# Patient Record
Sex: FEMALE | Race: WHITE | Employment: FULL TIME
[De-identification: names, ages, dates, MRNs, and addresses within clinical notes are randomized per-mention and may not be internally consistent; named-entity substitution may affect disease eponyms.]

---

## 2017-07-15 ENCOUNTER — HEALTH MAINTENANCE LETTER (OUTPATIENT)
Age: 32
End: 2017-07-15

## 2018-10-17 LAB
HBV SURFACE AG SERPL QL IA: NEGATIVE
HIV 1+2 AB+HIV1 P24 AG SERPL QL IA: NON REACTIVE
RUBELLA ABY IGG: NORMAL

## 2019-04-12 LAB — GROUP B STREP PCR: NEGATIVE

## 2019-04-22 ENCOUNTER — HOSPITAL ENCOUNTER (OUTPATIENT)
Facility: CLINIC | Age: 34
Discharge: HOME | End: 2019-04-22
Attending: OBSTETRICS & GYNECOLOGY | Admitting: OBSTETRICS & GYNECOLOGY
Payer: COMMERCIAL

## 2019-04-22 VITALS
SYSTOLIC BLOOD PRESSURE: 117 MMHG | RESPIRATION RATE: 17 BRPM | BODY MASS INDEX: 26.46 KG/M2 | DIASTOLIC BLOOD PRESSURE: 62 MMHG | HEIGHT: 71 IN | TEMPERATURE: 99 F | HEART RATE: 75 BPM | WEIGHT: 189 LBS

## 2019-04-22 DIAGNOSIS — N39.0 URINARY TRACT INFECTION WITHOUT HEMATURIA, SITE UNSPECIFIED: Primary | ICD-10-CM

## 2019-04-22 DIAGNOSIS — Z36.89 ENCOUNTER FOR TRIAGE IN PREGNANT PATIENT: ICD-10-CM

## 2019-04-22 LAB
ALBUMIN UR-MCNC: NEGATIVE MG/DL
APPEARANCE UR: ABNORMAL
BACTERIA #/AREA URNS HPF: ABNORMAL /HPF
BILIRUB UR QL STRIP: NEGATIVE
COLOR UR AUTO: ABNORMAL
GLUCOSE UR STRIP-MCNC: NEGATIVE MG/DL
HGB UR QL STRIP: NEGATIVE
KETONES UR STRIP-MCNC: NEGATIVE MG/DL
LEUKOCYTE ESTERASE UR QL STRIP: ABNORMAL
MUCOUS THREADS #/AREA URNS LPF: PRESENT /LPF
NITRATE UR QL: NEGATIVE
PH UR STRIP: 6 PH (ref 5–7)
RBC #/AREA URNS AUTO: 3 /HPF (ref 0–2)
SOURCE: ABNORMAL
SP GR UR STRIP: 1.01 (ref 1–1.03)
SQUAMOUS #/AREA URNS AUTO: 27 /HPF (ref 0–1)
UROBILINOGEN UR STRIP-MCNC: NORMAL MG/DL (ref 0–2)
WBC #/AREA URNS AUTO: 13 /HPF (ref 0–5)

## 2019-04-22 PROCEDURE — 87086 URINE CULTURE/COLONY COUNT: CPT | Performed by: OBSTETRICS & GYNECOLOGY

## 2019-04-22 PROCEDURE — 25800030 ZZH RX IP 258 OP 636: Performed by: OBSTETRICS & GYNECOLOGY

## 2019-04-22 PROCEDURE — G0463 HOSPITAL OUTPT CLINIC VISIT: HCPCS | Mod: 25

## 2019-04-22 PROCEDURE — 59025 FETAL NON-STRESS TEST: CPT

## 2019-04-22 PROCEDURE — 81001 URINALYSIS AUTO W/SCOPE: CPT | Performed by: OBSTETRICS & GYNECOLOGY

## 2019-04-22 RX ORDER — SODIUM CHLORIDE, SODIUM LACTATE, POTASSIUM CHLORIDE, CALCIUM CHLORIDE 600; 310; 30; 20 MG/100ML; MG/100ML; MG/100ML; MG/100ML
INJECTION, SOLUTION INTRAVENOUS CONTINUOUS
Status: DISCONTINUED | OUTPATIENT
Start: 2019-04-22 | End: 2019-04-22 | Stop reason: HOSPADM

## 2019-04-22 RX ORDER — NITROFURANTOIN 25; 75 MG/1; MG/1
100 CAPSULE ORAL 2 TIMES DAILY
Qty: 10 CAPSULE | Refills: 0 | Status: SHIPPED | OUTPATIENT
Start: 2019-04-22 | End: 2019-04-27

## 2019-04-22 RX ORDER — ONDANSETRON 2 MG/ML
4 INJECTION INTRAMUSCULAR; INTRAVENOUS EVERY 6 HOURS PRN
Status: DISCONTINUED | OUTPATIENT
Start: 2019-04-22 | End: 2019-04-22 | Stop reason: HOSPADM

## 2019-04-22 RX ADMIN — SODIUM CHLORIDE, POTASSIUM CHLORIDE, SODIUM LACTATE AND CALCIUM CHLORIDE: 600; 310; 30; 20 INJECTION, SOLUTION INTRAVENOUS at 17:48

## 2019-04-22 ASSESSMENT — MIFFLIN-ST. JEOR: SCORE: 1668.43

## 2019-04-22 NOTE — PLAN OF CARE
Dr Harden updated of patient arrival and hx--see previous note. Orders to start IV and give 1L LR to try and slow contractions. Recheck SVE at 1900. Will update sooner with any increase in contraction intensity and/or decels. Will send UA.

## 2019-04-22 NOTE — PLAN OF CARE
Data: Patient presented to [facility]: 2019  4:32 PM.  Reason for maternal/fetal assessment is uterine contractions. Patient reports contractions started at 0900 this AM. Reports cramping like sensations, rates pain 2/10. Nervous with abnormal cord insertion and hx or faster delivery once she really kicked into labor.  Patient is a .  Prenatal record reviewed. Pregnancy  has been complicated by velamentous cord insertion. +2 dependent pitting edema noted. BP WNL.  Gestational Age 37w2d. VSS. Fetal movement present. Patient denies leaking of vaginal fluid/rupture of membranes, vaginal bleeding, pelvic pressure, nausea, vomiting, headache, visual disturbances, epigastric or URQ pain. Support person is present.   Action: Verbal consent for EFM. Triage assessment completed. Bill of rights reviewed. Oriented to room and call light, patient verbalized understanding.  Response: Patient verbalized agreement with plan. Will contact Dr Kortney Aponte with update and further orders.

## 2019-04-23 LAB
BACTERIA SPEC CULT: NORMAL
Lab: NORMAL
SPECIMEN SOURCE: NORMAL

## 2019-04-23 NOTE — PROGRESS NOTES
MD updated of SVE no change. UCsq2-5 after IV flush. FHTs WNL. Will send UA for culture. Will send macrobid 100mg BID. Patient OK to discharge to home now, or can stay and be rechecked in 1 hour.

## 2019-04-23 NOTE — PROGRESS NOTES
Data: Patient assessed in the [facility] for uterine contractions.  Cervical exam 3/70/-2.  Membranes intact.  Contractions not indicative of labor.  Action:  Presumed adequate fetal oxygenation documented (see flow record). Discharge instructions reviewed.  Patient instructed to report change in fetal movement, vaginal leaking of fluid or bleeding, abdominal pain, or any concerns related to the pregnancy to her nurse/physician.    Response: Orders to discharge home per Kortney Aponte.  Patient verbalized understanding of education and verbalized agreement with plan. Discharged to home at 1930.

## 2019-04-23 NOTE — DISCHARGE INSTRUCTIONS
Discharge Instruction for Undelivered Patients      You were seen for: Labor Assessment  We Consulted: Dr Harden  You had (Test or Medicine):Non-stress test, UA     Diet:   Drink 8 to 12 glasses of liquids (milk, juice, water) every day.  You may eat meals and snacks.     Activity:  Call your doctor or nurse midwife if your baby is moving less than usual.     Call your provider if you notice:  Swelling in your face or increased swelling in your hands or legs.  Headaches that are not relieved by Tylenol (acetaminophen).  Changes in your vision (blurring: seeing spots or stars.)  Nausea (sick to your stomach) and vomiting (throwing up).   Weight gain of 5 pounds or more per week.  Heartburn that doesn't go away.  Signs of bladder infection: pain when you urinate (use the toilet), need to go more often and more urgently.  The bag of davis (rupture of membranes) breaks, or you notice leaking in your underwear.  Bright red blood in your underwear.  Abdominal (lower belly) or stomach pain.  Second (plus) baby: Contractions (tightening) less than 10 minutes apart and getting stronger.  Increase or change in vaginal discharge (note the color and amount)  Other: start antibiotics, drink fluids.    Follow-up:  As scheduled in the clinic

## 2019-04-24 ENCOUNTER — ANESTHESIA EVENT (OUTPATIENT)
Dept: OBGYN | Facility: CLINIC | Age: 34
End: 2019-04-24
Payer: COMMERCIAL

## 2019-04-24 ENCOUNTER — HOSPITAL ENCOUNTER (INPATIENT)
Facility: CLINIC | Age: 34
LOS: 1 days | Discharge: HOME HEALTH | End: 2019-04-25
Attending: OBSTETRICS & GYNECOLOGY | Admitting: OBSTETRICS & GYNECOLOGY
Payer: COMMERCIAL

## 2019-04-24 ENCOUNTER — ANESTHESIA (OUTPATIENT)
Dept: OBGYN | Facility: CLINIC | Age: 34
End: 2019-04-24
Payer: COMMERCIAL

## 2019-04-24 LAB
ABO + RH BLD: NORMAL
ABO + RH BLD: NORMAL
HGB BLD-MCNC: 11.5 G/DL (ref 11.7–15.7)
SPECIMEN EXP DATE BLD: NORMAL

## 2019-04-24 PROCEDURE — 27110038 ZZH RX 271: Performed by: ANESTHESIOLOGY

## 2019-04-24 PROCEDURE — 00HU33Z INSERTION OF INFUSION DEVICE INTO SPINAL CANAL, PERCUTANEOUS APPROACH: ICD-10-PCS | Performed by: ANESTHESIOLOGY

## 2019-04-24 PROCEDURE — 37000011 ZZH ANESTHESIA WARD SERVICE

## 2019-04-24 PROCEDURE — 25000132 ZZH RX MED GY IP 250 OP 250 PS 637: Performed by: OBSTETRICS & GYNECOLOGY

## 2019-04-24 PROCEDURE — 72200001 ZZH LABOR CARE VAGINAL DELIVERY SINGLE

## 2019-04-24 PROCEDURE — 3E0R3BZ INTRODUCTION OF ANESTHETIC AGENT INTO SPINAL CANAL, PERCUTANEOUS APPROACH: ICD-10-PCS | Performed by: ANESTHESIOLOGY

## 2019-04-24 PROCEDURE — 86900 BLOOD TYPING SEROLOGIC ABO: CPT | Performed by: OBSTETRICS & GYNECOLOGY

## 2019-04-24 PROCEDURE — 25000128 H RX IP 250 OP 636: Performed by: ANESTHESIOLOGY

## 2019-04-24 PROCEDURE — 0064U ANTB TP TOTAL&RPR IA QUAL: CPT | Performed by: OBSTETRICS & GYNECOLOGY

## 2019-04-24 PROCEDURE — G0463 HOSPITAL OUTPT CLINIC VISIT: HCPCS

## 2019-04-24 PROCEDURE — 25000128 H RX IP 250 OP 636

## 2019-04-24 PROCEDURE — 40000671 ZZH STATISTIC ANESTHESIA CASE

## 2019-04-24 PROCEDURE — 85018 HEMOGLOBIN: CPT | Performed by: OBSTETRICS & GYNECOLOGY

## 2019-04-24 PROCEDURE — 86901 BLOOD TYPING SEROLOGIC RH(D): CPT | Performed by: OBSTETRICS & GYNECOLOGY

## 2019-04-24 PROCEDURE — 25800030 ZZH RX IP 258 OP 636: Performed by: ANESTHESIOLOGY

## 2019-04-24 PROCEDURE — 25800030 ZZH RX IP 258 OP 636: Performed by: OBSTETRICS & GYNECOLOGY

## 2019-04-24 PROCEDURE — 12000000 ZZH R&B MED SURG/OB

## 2019-04-24 PROCEDURE — 0KQM0ZZ REPAIR PERINEUM MUSCLE, OPEN APPROACH: ICD-10-PCS | Performed by: OBSTETRICS & GYNECOLOGY

## 2019-04-24 PROCEDURE — 25000125 ZZHC RX 250: Performed by: OBSTETRICS & GYNECOLOGY

## 2019-04-24 RX ORDER — OXYCODONE AND ACETAMINOPHEN 5; 325 MG/1; MG/1
1 TABLET ORAL
Status: DISCONTINUED | OUTPATIENT
Start: 2019-04-24 | End: 2019-04-24

## 2019-04-24 RX ORDER — ACETAMINOPHEN 325 MG/1
650 TABLET ORAL EVERY 4 HOURS PRN
Status: DISCONTINUED | OUTPATIENT
Start: 2019-04-24 | End: 2019-04-25 | Stop reason: HOSPADM

## 2019-04-24 RX ORDER — NALOXONE HYDROCHLORIDE 0.4 MG/ML
.1-.4 INJECTION, SOLUTION INTRAMUSCULAR; INTRAVENOUS; SUBCUTANEOUS
Status: DISCONTINUED | OUTPATIENT
Start: 2019-04-24 | End: 2019-04-24

## 2019-04-24 RX ORDER — ONDANSETRON 2 MG/ML
INJECTION INTRAMUSCULAR; INTRAVENOUS
Status: COMPLETED
Start: 2019-04-24 | End: 2019-04-24

## 2019-04-24 RX ORDER — CARBOPROST TROMETHAMINE 250 UG/ML
250 INJECTION, SOLUTION INTRAMUSCULAR
Status: DISCONTINUED | OUTPATIENT
Start: 2019-04-24 | End: 2019-04-24

## 2019-04-24 RX ORDER — AMOXICILLIN 250 MG
2 CAPSULE ORAL 2 TIMES DAILY
Status: DISCONTINUED | OUTPATIENT
Start: 2019-04-24 | End: 2019-04-25 | Stop reason: HOSPADM

## 2019-04-24 RX ORDER — IBUPROFEN 800 MG/1
800 TABLET, FILM COATED ORAL
Status: DISCONTINUED | OUTPATIENT
Start: 2019-04-24 | End: 2019-04-24

## 2019-04-24 RX ORDER — OXYTOCIN 10 [USP'U]/ML
10 INJECTION, SOLUTION INTRAMUSCULAR; INTRAVENOUS
Status: DISCONTINUED | OUTPATIENT
Start: 2019-04-24 | End: 2019-04-25 | Stop reason: HOSPADM

## 2019-04-24 RX ORDER — HYDROMORPHONE HYDROCHLORIDE 1 MG/ML
INJECTION, SOLUTION INTRAMUSCULAR; INTRAVENOUS; SUBCUTANEOUS
Status: COMPLETED
Start: 2019-04-24 | End: 2019-04-24

## 2019-04-24 RX ORDER — OXYTOCIN/0.9 % SODIUM CHLORIDE 30/500 ML
1-24 PLASTIC BAG, INJECTION (ML) INTRAVENOUS CONTINUOUS
Status: DISCONTINUED | OUTPATIENT
Start: 2019-04-24 | End: 2019-04-24

## 2019-04-24 RX ORDER — OXYTOCIN 10 [USP'U]/ML
10 INJECTION, SOLUTION INTRAMUSCULAR; INTRAVENOUS
Status: DISCONTINUED | OUTPATIENT
Start: 2019-04-24 | End: 2019-04-24

## 2019-04-24 RX ORDER — AMOXICILLIN 250 MG
1 CAPSULE ORAL 2 TIMES DAILY
Status: DISCONTINUED | OUTPATIENT
Start: 2019-04-24 | End: 2019-04-25 | Stop reason: HOSPADM

## 2019-04-24 RX ORDER — ONDANSETRON 2 MG/ML
4 INJECTION INTRAMUSCULAR; INTRAVENOUS EVERY 6 HOURS PRN
Status: DISCONTINUED | OUTPATIENT
Start: 2019-04-24 | End: 2019-04-24

## 2019-04-24 RX ORDER — OXYTOCIN/0.9 % SODIUM CHLORIDE 30/500 ML
100 PLASTIC BAG, INJECTION (ML) INTRAVENOUS CONTINUOUS
Status: DISCONTINUED | OUTPATIENT
Start: 2019-04-24 | End: 2019-04-25 | Stop reason: HOSPADM

## 2019-04-24 RX ORDER — OXYTOCIN/0.9 % SODIUM CHLORIDE 30/500 ML
340 PLASTIC BAG, INJECTION (ML) INTRAVENOUS CONTINUOUS PRN
Status: DISCONTINUED | OUTPATIENT
Start: 2019-04-24 | End: 2019-04-25 | Stop reason: HOSPADM

## 2019-04-24 RX ORDER — FENTANYL CITRATE 50 UG/ML
50-100 INJECTION, SOLUTION INTRAMUSCULAR; INTRAVENOUS
Status: DISCONTINUED | OUTPATIENT
Start: 2019-04-24 | End: 2019-04-24

## 2019-04-24 RX ORDER — ONDANSETRON 4 MG/1
4 TABLET, ORALLY DISINTEGRATING ORAL EVERY 6 HOURS PRN
Status: DISCONTINUED | OUTPATIENT
Start: 2019-04-24 | End: 2019-04-24

## 2019-04-24 RX ORDER — HYDROCORTISONE 2.5 %
CREAM (GRAM) TOPICAL 3 TIMES DAILY PRN
Status: DISCONTINUED | OUTPATIENT
Start: 2019-04-24 | End: 2019-04-25 | Stop reason: HOSPADM

## 2019-04-24 RX ORDER — ONDANSETRON 2 MG/ML
4 INJECTION INTRAMUSCULAR; INTRAVENOUS EVERY 6 HOURS PRN
Status: DISCONTINUED | OUTPATIENT
Start: 2019-04-24 | End: 2019-04-25 | Stop reason: HOSPADM

## 2019-04-24 RX ORDER — BUPIVACAINE HCL/0.9 % NACL/PF 0.125 %
PLASTIC BAG, INJECTION (ML) EPIDURAL CONTINUOUS
Status: DISCONTINUED | OUTPATIENT
Start: 2019-04-24 | End: 2019-04-24

## 2019-04-24 RX ORDER — EPHEDRINE SULFATE 50 MG/ML
5 INJECTION, SOLUTION INTRAMUSCULAR; INTRAVENOUS; SUBCUTANEOUS
Status: DISCONTINUED | OUTPATIENT
Start: 2019-04-24 | End: 2019-04-24

## 2019-04-24 RX ORDER — HALOPERIDOL 5 MG/ML
1 INJECTION INTRAMUSCULAR EVERY 6 HOURS PRN
Status: DISCONTINUED | OUTPATIENT
Start: 2019-04-24 | End: 2019-04-25 | Stop reason: HOSPADM

## 2019-04-24 RX ORDER — LANOLIN 100 %
OINTMENT (GRAM) TOPICAL
Status: DISCONTINUED | OUTPATIENT
Start: 2019-04-24 | End: 2019-04-25 | Stop reason: HOSPADM

## 2019-04-24 RX ORDER — HALOPERIDOL 5 MG/ML
1 INJECTION INTRAMUSCULAR EVERY 6 HOURS PRN
Status: DISCONTINUED | OUTPATIENT
Start: 2019-04-24 | End: 2019-04-24

## 2019-04-24 RX ORDER — NALOXONE HYDROCHLORIDE 0.4 MG/ML
.1-.4 INJECTION, SOLUTION INTRAMUSCULAR; INTRAVENOUS; SUBCUTANEOUS
Status: DISCONTINUED | OUTPATIENT
Start: 2019-04-24 | End: 2019-04-25 | Stop reason: HOSPADM

## 2019-04-24 RX ORDER — ACETAMINOPHEN 325 MG/1
650 TABLET ORAL EVERY 4 HOURS PRN
Status: DISCONTINUED | OUTPATIENT
Start: 2019-04-24 | End: 2019-04-24

## 2019-04-24 RX ORDER — BISACODYL 10 MG
10 SUPPOSITORY, RECTAL RECTAL DAILY PRN
Status: DISCONTINUED | OUTPATIENT
Start: 2019-04-26 | End: 2019-04-25 | Stop reason: HOSPADM

## 2019-04-24 RX ORDER — OXYTOCIN/0.9 % SODIUM CHLORIDE 30/500 ML
100-340 PLASTIC BAG, INJECTION (ML) INTRAVENOUS CONTINUOUS PRN
Status: COMPLETED | OUTPATIENT
Start: 2019-04-24 | End: 2019-04-24

## 2019-04-24 RX ORDER — LIDOCAINE 40 MG/G
CREAM TOPICAL
Status: DISCONTINUED | OUTPATIENT
Start: 2019-04-24 | End: 2019-04-24

## 2019-04-24 RX ORDER — METHYLERGONOVINE MALEATE 0.2 MG/ML
200 INJECTION INTRAVENOUS
Status: DISCONTINUED | OUTPATIENT
Start: 2019-04-24 | End: 2019-04-24

## 2019-04-24 RX ORDER — IBUPROFEN 800 MG/1
800 TABLET, FILM COATED ORAL EVERY 6 HOURS PRN
Status: DISCONTINUED | OUTPATIENT
Start: 2019-04-24 | End: 2019-04-25 | Stop reason: HOSPADM

## 2019-04-24 RX ORDER — SODIUM CHLORIDE, SODIUM LACTATE, POTASSIUM CHLORIDE, CALCIUM CHLORIDE 600; 310; 30; 20 MG/100ML; MG/100ML; MG/100ML; MG/100ML
INJECTION, SOLUTION INTRAVENOUS CONTINUOUS
Status: DISCONTINUED | OUTPATIENT
Start: 2019-04-24 | End: 2019-04-24

## 2019-04-24 RX ORDER — NALBUPHINE HYDROCHLORIDE 10 MG/ML
2.5-5 INJECTION, SOLUTION INTRAMUSCULAR; INTRAVENOUS; SUBCUTANEOUS EVERY 6 HOURS PRN
Status: DISCONTINUED | OUTPATIENT
Start: 2019-04-24 | End: 2019-04-24

## 2019-04-24 RX ADMIN — ONDANSETRON 4 MG: 2 INJECTION INTRAMUSCULAR; INTRAVENOUS at 17:56

## 2019-04-24 RX ADMIN — LIDOCAINE HYDROCHLORIDE 20 ML: 10 INJECTION, SOLUTION EPIDURAL; INFILTRATION; INTRACAUDAL; PERINEURAL at 11:14

## 2019-04-24 RX ADMIN — IBUPROFEN 800 MG: 800 TABLET ORAL at 13:31

## 2019-04-24 RX ADMIN — SODIUM CHLORIDE, POTASSIUM CHLORIDE, SODIUM LACTATE AND CALCIUM CHLORIDE: 600; 310; 30; 20 INJECTION, SOLUTION INTRAVENOUS at 09:27

## 2019-04-24 RX ADMIN — SODIUM CHLORIDE, POTASSIUM CHLORIDE, SODIUM LACTATE AND CALCIUM CHLORIDE 1000 ML: 600; 310; 30; 20 INJECTION, SOLUTION INTRAVENOUS at 09:15

## 2019-04-24 RX ADMIN — SENNOSIDES AND DOCUSATE SODIUM 1 TABLET: 8.6; 5 TABLET ORAL at 20:19

## 2019-04-24 RX ADMIN — IBUPROFEN 800 MG: 800 TABLET ORAL at 20:18

## 2019-04-24 RX ADMIN — Medication: at 09:20

## 2019-04-24 RX ADMIN — OXYTOCIN-SODIUM CHLORIDE 0.9% IV SOLN 30 UNIT/500ML 340 ML/HR: 30-0.9/5 SOLUTION at 11:21

## 2019-04-24 RX ADMIN — Medication 0.5 MG: at 09:20

## 2019-04-24 RX ADMIN — ACETAMINOPHEN 650 MG: 325 TABLET, FILM COATED ORAL at 20:18

## 2019-04-24 RX ADMIN — ONDANSETRON 4 MG: 2 INJECTION INTRAMUSCULAR; INTRAVENOUS at 10:07

## 2019-04-24 ASSESSMENT — MIFFLIN-ST. JEOR: SCORE: 1682.04

## 2019-04-24 NOTE — PLAN OF CARE
Data: Marla Pavon transferred to 426 via cart at 1530. Baby transferred via parent's arms.  Action: Receiving unit notified of transfer: Yes. Patient and family notified of room change. Report given to Laurie at 1530. Belongings sent to receiving unit. Accompanied by Registered Nurse. Oriented patient to surroundings. Call light within reach. ID bands double-checked with receiving RN.  Response: Patient tolerated transfer and is stable.

## 2019-04-24 NOTE — PROVIDER NOTIFICATION
MD text paged cervical update and pt status. Will hold off on pitocin due to good cervical change and adequate contractions.

## 2019-04-24 NOTE — PROVIDER NOTIFICATION
19 0651   Provider Notification   Provider Name/Title    Method of Notification Phone   Request Evaluate - Remote   Notification Reason Patient Arrived;Labor Status;Uterine Activity;Pain;SVE    in the department and updated with patient arrival.  at 37.4 wks here in labor. FHT category 1 tracing. Contractions every 4-5 min apart, patient needing to breath with contractions however coping. MD aware of patient's history. SVE 4.5/70-80/-2. Intrapartum orders received, epidural ok. Plan to move patient into labor room. POC discussed with patient and FOB.

## 2019-04-24 NOTE — BRIEF OP NOTE
Delivery Summary:    1. IUP at 37+4/7 wks. Spontaneous labor.   2. GBS negative, RH positive. AROM with clear fluid.  3. Epidural for pain management. Cat 2 tracing prior to delivery.   4.  of male in SYLVIA presentation without nuchal cord. Delayed clamp done.   5. Placenta intact, marginal cord. Disposed of.   6. Perineum with 2nd degree lac with 3-0 vicryl for repair.   7. See chart for QBL.   8. Dr. Burr for delivery.     Margaret Burr

## 2019-04-24 NOTE — ANESTHESIA PREPROCEDURE EVALUATION
"Anesthesia Pre-Procedure Evaluation    Patient: Marla Pavon   MRN: [id number] : 1985          Preoperative Diagnosis: * No surgery found *        Past Medical History:   Diagnosis Date     NO ACTIVE PROBLEMS      Past Surgical History:   Procedure Laterality Date     ARTHROSCOPIC RECONSTRUCTION ANTERIOR CRUCIATE LIGAMENT  2000    Left     DENTAL SURGERY       ENT SURGERY       MOUTH SURGERY  2008    4 wisdom teeth 2 different surg     MOUTH SURGERY  1999    Molars ucovered     SINUS SURGERY  2009     Anesthesia Evaluation       history and physical reviewed .             ROS/MED HX    ENT/Pulmonary:  - neg pulmonary ROS     Neurologic:  - neg neurologic ROS     Cardiovascular:  - neg cardiovascular ROS       METS/Exercise Tolerance:     Hematologic:         Musculoskeletal:         GI/Hepatic:  - neg GI/hepatic ROS       Renal/Genitourinary:         Endo:         Psychiatric:         Infectious Disease:         Malignancy:         Other:                     neg OB ROS            Physical Exam      Airway     Dental     Cardiovascular       Pulmonary     Other findings:  at term, in labor, requesting pain  management        Lab Results   Component Value Date    WBC 8.2 2011    HGB 11.5 (L) 2019    HCT 40.3 2011     2011    SED 4 2011    HCG Negative 2011       Preop Vitals  BP Readings from Last 3 Encounters:   19 122/74   19 117/62   16 105/64    Pulse Readings from Last 3 Encounters:   19 75   11 58      Resp Readings from Last 3 Encounters:   19 18   19 17   16 16    SpO2 Readings from Last 3 Encounters:   11 98%      Temp Readings from Last 1 Encounters:   19 98.8  F (37.1  C) (Oral)    Ht Readings from Last 1 Encounters:   19 1.803 m (5' 11\")      Wt Readings from Last 1 Encounters:   19 87.1 kg (192 lb)    Estimated body mass index is 26.78 kg/m  as calculated from the following:    " "Height as of this encounter: 1.803 m (5' 11\").    Weight as of this encounter: 87.1 kg (192 lb).       Anesthesia Plan      History & Physical Review      ASA Status:  2 .  OB Epidural Asa: 2       Plan for     Discussed risks of epidural catheter placement, including, but not limited to, bruising/bleeding, infection, pain, failure of epidural medications to relieve pain, dural puncture with subsequent headache/ need for epidural blood patch and nerve damage.  All questions answered, understanding voiced and she wishes to proceed.      Postoperative Care      Consents  Anesthetic plan, risks, benefits and alternatives discussed with:  Patient..                 Gumaro Gomez MD                    .  "

## 2019-04-24 NOTE — PROVIDER NOTIFICATION
Dr Burr at bedside for AROM. Clear fluid. Ok with epidural anytime. Orders to start pitocin after epidural if needed. Cat 1 tracing.

## 2019-04-24 NOTE — L&D DELIVERY NOTE
Delivery Date:  2019      INDICATIONS:  The patient is a 33-year-old G2, P1-0-0-1 at 37 and 4/7 weeks whose pregnancy was complicated by a marginal cord insertion followed with growth ultrasounds where growth was greater than the 90th percentile.  She was seen in the office yesterday and had stripping of membranes and had occasional contractions afterwards.  She began having regular painful contractions this a.m. and was seen in Labor and Delivery, at which time she was found to have regular cervical change with contractions and declared to be in labor.  She was admitted and at approximately 4 cm and underwent an assisted rupture of membranes with clear fluid and received an epidural shortly thereafter.  She made it uncomplicated to complete cervical dilation and began actively pushing.  She was known to be GBS negative, Rh positive and had primarily a category 1 tracing.  However, during pushing, a category 2, she delivered a male infant in SYLVIA presentation with no evidence of nuchal cord.  A delayed cord clamp was performed.  The cord was then clamped by myself and cut by the father.  The placenta was found to be intact.  Marginal cord insertion was disposed, and the perineum was found to have a second-degree perineal laceration that was repaired with 3-0 Vicryl in the normal fashion.  Please see her chart for QBL.  Dr. Alanis was present for delivery.  She will be transferred to postpartum in stable condition.         KADE ALANIS MD             D: 2019   T: 2019   MT:       Name:     MAGALY GORDON   MRN:      [id number]        Account:        [id number]   :      1985        Delivery Date:  2019               Document: [id number]

## 2019-04-24 NOTE — PLAN OF CARE
Data: Patient presented to [facility]: 2019  6:09 AM.  Reason for maternal/fetal assessment is uterine contractions. Patient reports having contractions all day yesterday and last-night, noticing increasing vaginal discharge.  Patient is a .  Prenatal record reviewed. Pregnancy  has been complicated by villamentous cord insertion.  Gestational Age 37w4d. VSS. Fetal movement present. Patient denies leaking of vaginal fluid/rupture of membranes, vaginal bleeding, abdominal pain, pelvic pressure, nausea, vomiting, headache, visual disturbances, epigastric or URQ pain, significant edema. Support person is present.   Action: Verbal consent for EFM. Triage assessment completed. Bill of rights reviewed.  Response: Patient verbalized agreement with plan. Will contact Dr Margaret Burr with update and further orders.

## 2019-04-24 NOTE — ANESTHESIA PROCEDURE NOTES
Peripheral nerve/Neuraxial procedure note : epidural catheter  Pre-Procedure  Performed by Gumaro Gomez MD  Location: OB, floor    Procedure Times:4/24/2019 9:11 AM and 4/24/2019 9:29 AM  Pre-Anesthestic Checklist: patient identified, IV checked, risks and benefits discussed, informed consent, monitors and equipment checked, pre-op evaluation and at physician/surgeon's request    Timeout  Correct Patient: Yes   Correct Procedure: Yes   Correct Site: Yes   Correct Laterality: N/A   Correct Position: Yes   Site Marked: No   .   Procedure Documentation    .    Procedure:    Epidural catheter.  Insertion Site:L2-3  (midline approach) Injection technique: LORT saline   Local skin infiltrated with 2.5 mL of 1% lidocaine.  SYLWIA at 5 cm     Patient Prep;mask, sterile gloves, povidone-iodine 7.5% surgical scrub, patient draped.  .  Needle: Touhy needle Needle Gauge: 17.    Needle Length (Inches) 3.5  # of attempts: 1 and # of redirects:  .   . .  Catheter threaded easily  5 cm epidural space.  10 cm at skin.   .    Assessment/Narrative  Paresthesias: No.  .  .  Aspiration negative for heme or CSF  . Test dose of 5 mL lidocaine 1.5% w/ 1:200,000 epinephrine at 09:20.  Test dose negative for signs of intravascular, subdural or intrathecal injection. Comments:  I or my partner am immediately available. I or my partner will monitor the patient and supervise nursing care at necessary intervals.    Given 10 ml 0.125% bupivicaine infusion with 0.5 mg hydromorphone.4/24/2019 9:20 AM

## 2019-04-24 NOTE — H&P
No significant change in general health status based on examination of the patient, review of Nursing Admission Database and prenatal record.    EFW: 8lb 8oz     Margaret Burr

## 2019-04-25 VITALS
SYSTOLIC BLOOD PRESSURE: 100 MMHG | RESPIRATION RATE: 16 BRPM | HEART RATE: 63 BPM | WEIGHT: 192 LBS | HEIGHT: 71 IN | TEMPERATURE: 98.3 F | OXYGEN SATURATION: 100 % | DIASTOLIC BLOOD PRESSURE: 65 MMHG | BODY MASS INDEX: 26.88 KG/M2

## 2019-04-25 LAB
HGB BLD-MCNC: 10.6 G/DL (ref 11.7–15.7)
T PALLIDUM AB SER QL: NONREACTIVE

## 2019-04-25 PROCEDURE — 36415 COLL VENOUS BLD VENIPUNCTURE: CPT | Performed by: OBSTETRICS & GYNECOLOGY

## 2019-04-25 PROCEDURE — 25000132 ZZH RX MED GY IP 250 OP 250 PS 637: Performed by: OBSTETRICS & GYNECOLOGY

## 2019-04-25 PROCEDURE — 85018 HEMOGLOBIN: CPT | Performed by: OBSTETRICS & GYNECOLOGY

## 2019-04-25 RX ORDER — IBUPROFEN 800 MG/1
800 TABLET, FILM COATED ORAL EVERY 6 HOURS PRN
COMMUNITY
Start: 2019-04-25

## 2019-04-25 RX ORDER — ACETAMINOPHEN 325 MG/1
650 TABLET ORAL EVERY 4 HOURS PRN
COMMUNITY
Start: 2019-04-25

## 2019-04-25 RX ADMIN — ACETAMINOPHEN 650 MG: 325 TABLET, FILM COATED ORAL at 08:51

## 2019-04-25 RX ADMIN — ACETAMINOPHEN 650 MG: 325 TABLET, FILM COATED ORAL at 02:43

## 2019-04-25 RX ADMIN — IBUPROFEN 800 MG: 800 TABLET ORAL at 02:43

## 2019-04-25 RX ADMIN — SENNOSIDES AND DOCUSATE SODIUM 1 TABLET: 8.6; 5 TABLET ORAL at 08:52

## 2019-04-25 RX ADMIN — IBUPROFEN 800 MG: 800 TABLET ORAL at 08:52

## 2019-04-25 NOTE — PLAN OF CARE
"Pt able to get some periods of rest between cares w/  rooming-in for night.  Taking ibuprofen and tylenol when she can, to \"stay ahead of the pain\".  Tucks or ice to nguyen lac.  Voiding w/o difficulty.  BPs still slightly low; HR stable and WDL.  Pt states she occasionally feels dizzy.  Encouraged her to take care when ambulating and changing position; pt will call for assist if she feels she can't ambulate d/t dizziness.  Ambulating w/o difficulty in room, however.  Spouse present and supportive.  "

## 2019-04-25 NOTE — PLAN OF CARE
UAL. VSS. States she is voiding without difficulty. Reports minimal pain for which she has taken Ibuprofen and states this has been adequate.Requesting  discharge today. Discharge instructions completed. Patient states she understands all discharge instructions and all her questions have been answered. Verbalizes when she needs to return to the clinic for follow up for herself and baby. She is caring for herself and her baby independently.    Discharged to home with baby at 1430.

## 2019-04-25 NOTE — PLAN OF CARE
Patients mobililty level scored using the bedside mobility assistance tool (BMAT). Patient is at a mobility level test number: 4. Mobility equipment used: none required. Required assist of 0 staff members. Further use of BMAT scoring not required.

## 2019-04-25 NOTE — ANESTHESIA POSTPROCEDURE EVALUATION
Patient: Marla Pavon    * No procedures listed *    Diagnosis:* No pre-op diagnosis entered *  Diagnosis Additional Information: IUP, in labor    Anesthesia Type:  Epidural    Note:  Anesthesia Post Evaluation         Comments:     S/P epidural for labor.   I or my partner was immediately available for management of this patient during epidural analgesia infusion.  VSS.  Doing well. Block resolved.  Neuro at baseline. Denies positional headache. Minimal side effects easily managed w/ PRN meds. No apparent anesthetic complications. No follow-up required.    Aung Montes MD          Last vitals:  Vitals:    04/24/19 2300 04/25/19 0243 04/25/19 0824   BP: 91/52 100/61 100/65   Pulse: 66  63   Resp: 18 18 16   Temp: 98.2  F (36.8  C) 98.2  F (36.8  C) 98.3  F (36.8  C)   SpO2:            Electronically Signed By: Aung Montes MD  April 25, 2019  4:36 PM

## 2019-04-25 NOTE — DISCHARGE INSTRUCTIONS
Make appointment to be seen by your provider in 6 weeks.    Lactation Consultant [phone]    [facility] [phone]    Postpartum Vaginal Delivery Instructions    Activity       Ask family and friends for help when you need it.    Do not place anything in your vagina for 6 weeks.    You are not restricted on other activities, but take it easy for a few weeks to allow your body to recover from delivery.  You are able to do any activities you feel up to that point.    No driving until you have stopped taking your pain medications (usually two weeks after delivery).     Call your health care provider if you have any of these symptoms:       Increased pain, swelling, redness, or fluid around your stiches from an episiotomy or perineal tear.    A fever above 100.4 F (38 C) with or without chills when placing a thermometer under your tongue.    You soak a sanitary pad with blood within 1 hour, or you see blood clots larger than a golf ball.    Bleeding that lasts more than 6 weeks.    Vaginal discharge that smells bad.    Severe pain, cramping or tenderness in your lower belly area.    A need to urinate more frequently (use the toilet more often), more urgently (use the toilet very quickly), or it burns when you urinate.    Nausea and vomiting.    Redness, swelling or pain around a vein in your leg.    Problems breastfeeding or a red or painful area on your breast.    Chest pain and cough or are gasping for air.    Problems coping with sadness, anxiety, or depression.  If you have any concerns about hurting yourself or the baby, call your provider immediately.     You have questions or concerns after you return home.     Keep your hands clean:  Always wash your hands before touching your perineal area and stitches.  This helps reduce your risk of infection.  If your hands aren't dirty, you may use an alcohol hand-rub to clean your hands. Keep your nails clean and short.

## 2019-04-25 NOTE — LACTATION NOTE
This note was copied from a baby's chart.  LC visit.  Her baby has been nursing well per her report.  She has a hx of overreactive letdown and oversupply so went to pumping and bottling for her first baby. LC reviewed ways of relieving some pressure to allow infant to relax during nursing and initial letdown.  LC encouraged her to try a hand pump prior to nursing if this baby also has a problem with letdown.  She is aware she may call prn.  No issues with nursing noted yet.

## 2019-04-25 NOTE — PLAN OF CARE
Resumed care after transfer. Room orientation completed. Call light within reach. Bands verified with ASHER Zepeda. VSS. Tolerating regular diet well. Pt up ad bri in room. Voiding without difficulty. Education and latch assistance provided. Bonding well with .  at bedside and supportive. Continue to monitor.

## 2019-10-08 ENCOUNTER — THERAPY VISIT (OUTPATIENT)
Dept: PHYSICAL THERAPY | Facility: CLINIC | Age: 34
End: 2019-10-08
Payer: COMMERCIAL

## 2019-10-08 DIAGNOSIS — M99.05 PELVIC SOMATIC DYSFUNCTION: ICD-10-CM

## 2019-10-08 DIAGNOSIS — M25.559 PAIN IN JOINT, PELVIC REGION AND THIGH, UNSPECIFIED LATERALITY: ICD-10-CM

## 2019-10-08 PROCEDURE — 97161 PT EVAL LOW COMPLEX 20 MIN: CPT | Mod: GP | Performed by: PHYSICAL THERAPIST

## 2019-10-08 PROCEDURE — 97110 THERAPEUTIC EXERCISES: CPT | Mod: GP | Performed by: PHYSICAL THERAPIST

## 2019-10-08 PROCEDURE — 97140 MANUAL THERAPY 1/> REGIONS: CPT | Mod: GP | Performed by: PHYSICAL THERAPIST

## 2019-10-08 PROCEDURE — 97112 NEUROMUSCULAR REEDUCATION: CPT | Mod: GP | Performed by: PHYSICAL THERAPIST

## 2019-10-08 NOTE — PROGRESS NOTES
[facility] Initial Evaluation  Subjective:  The history is provided by the patient.   Type of problem:  Pelvic dysfunction and other   Condition occurred with:  During pregnancy and after pregnancy. This is a chronic condition   Problem details: Patient's Chief Complaint: pt complains of pelvic pain/adductor and groin pain that has been ongoing since 14 weeks into her 2nd pregnancy and post partum.  She is 5 1/2 months post partum, .  Pt reports that pain is limiting her ability to walk, exercise and run.  Pt points to pain in R adductor region > left.  Does admit to pain with intercourse.  Has mild urge incontinence that is infrequent.  During her pregnancies, she reports babies sitting very low on her pelvis and she did experience pubic symphysis pain with her 2nd pregnancy.  Pt no longer breastfeeding.    Date of Onset: MD order dated 2019    Pain rating is: 4/10    Quality of pain is sharp/dull ache and frequency is constant/intermittent depending on activity    Pain dependence on time of day is: worse in the evening    Progression of symptoms: same    Imaging/Special Tests include: none    Previous treatments include: NA    Patient reports general health is: excellent    Pertinent Medical History includes: NA    Surgical History includes: L ACL surgery    Current Occupation is:     Barriers include: NA    Red flags include: NA    Patient's expectations for therapy include: improve strength to be able to resume active lifestyle  .   Patient reports pain:  SI joint left and groin. Radiates to:  Pubis and groin right. Associated with: weakness. Symptoms are exacerbated by walking, intercourse and running (standing) and relieved by rest.                      Objective:    Gait:  Decreased R hip extension with increased pelvic rotation noted with swing phase          Flexibility/Screens:   Positive screens:  SI Joint                   Lumbar/SI Evaluation  ROM:  AROM  Lumbar: normal      Lumbar Myotomes:  normal                                                    Pelvic Dysfunction Evaluation:        Flexibility:    Tightness present at:Adductors; Iliopsoas; Piriformis and Gluteals    Abdominal Wall:  Abdominal wall pelvic: noted poor TA activation with tendency to use upper abdominals/diaphragm.  Diastasis Recti:  Present (1 finger separation)      Pelvic Clock Exam:  Pelvic clock exam: R > L, palpation reproduced her pelvic pain.  Ischiocavernosis pain:  +  Bulbocavernosis pain:  +    Levator ANI:  ++        External Assessment:    Skin Condition:  Normal  Scars:  Well healed  Bearing Down/Coughing:  Normal  Tissue Symmetry:  Normal  Introitus:  Normal  Muscle Contraction/Perineal Mobility:  Slight lift, no urogential triangle descent and substitution  Internal Assessment:      Contraction/Grade:  Weak squeeze, 2 second hold (2)                    Hip Evaluation    Hip Strength:  : B hip abductor strength 4/5 with pain on L.  gluts bilaterally 4-/5 with pain resisted test on L.                              Functional Testing:          Quad:    Single leg squat:   Left:    Mild loss of control and excessive anterior knee excursion  Right:   Decreased hip/trunk flexion, excessive anterior knee excursion, femoral IR and moderate loss of control                     General     ROS    Assessment/Plan:    Patient is a 34 year old female with pelvic complaints.    Patient has the following significant findings with corresponding treatment plan.                Diagnosis 1:  Pelvic floor dysfunction   Pain -  manual therapy, self management, education and home program  Decreased ROM/flexibility - manual therapy, therapeutic exercise, therapeutic activity and home program  Decreased joint mobility - manual therapy, therapeutic exercise, therapeutic activity and home program  Decreased strength - therapeutic exercise, therapeutic activities and home program  Impaired gait - gait training  and home program  Impaired muscle performance - biofeedback, neuro re-education and home program  Decreased function - therapeutic activities and home program  Impaired posture - neuro re-education, therapeutic activities and home program    Therapy Evaluation Codes:   1) History comprised of:   Personal factors that impact the plan of care:      None.    Comorbidity factors that impact the plan of care are:      None.     Medications impacting care: None.  2) Examination of Body Systems comprised of:   Body structures and functions that impact the plan of care:      Lumbar spine, Pelvis and Sacral illiac joint.   Activity limitations that impact the plan of care are:      Standing, Walking and Using a tampon.  3) Clinical presentation characteristics are:   Stable/Uncomplicated.  4) Decision-Making    Low complexity using standardized patient assessment instrument and/or measureable assessment of functional outcome.  Cumulative Therapy Evaluation is: Low complexity.    Previous and current functional limitations:  (See Goal Flow Sheet for this information)    Short term and Long term goals: (See Goal Flow Sheet for this information)     Communication ability:  Patient appears to be able to clearly communicate and understand verbal and written communication and follow directions correctly.  Treatment Explanation - The following has been discussed with the patient:   RX ordered/plan of care  Anticipated outcomes  Possible risks and side effects  This patient would benefit from PT intervention to resume normal activities.   Rehab potential is good.    Frequency:  1 X week, once daily  Duration:  for 8 weeks  Discharge Plan:  Achieve all LTG.  Independent in home treatment program.  Reach maximal therapeutic benefit.    Please refer to the daily flowsheet for treatment today, total treatment time and time spent performing 1:1 timed codes.

## 2019-10-08 NOTE — PROGRESS NOTES
[facility] Initial Evaluation  Subjective:           Surgeries include:  Orthopedic surgery and other (Left ACL, sinus).  Current medications:  Other (NSAIDS).   Primary job tasks include:  Computer work and prolonged sitting.           Occupation: .                           Objective:  System    Physical Exam    General     ROS    Assessment/Plan:

## 2019-10-08 NOTE — LETTER
[facility] ATHLETIC [facility] PHYSICAL THERAPY  [id number]  [facility] [address]  [phone]    2019    Re: Marla Pavon   :   1985  MRN:  [id number]   REFERRING PHYSICIAN:   Margaret Burr    [facility] ATHLETIC [facility] PHYSICAL [facility]    Date of Initial Evaluation:  10-8-19  Visits:  Rxs Used: 1  Reason for Referral:     Pelvic somatic dysfunction  Pain in joint, pelvic region and thigh, unspecified laterality    EVALUATION SUMMARY    [facility] Athletic [facility] Initial Evaluation  Subjective:  The history is provided by the patient. Type of problem:  Pelvic dysfunction and other  Condition occurred with:  During pregnancy and after pregnancy. This is a chronic condition.  Problem details: Patient's Chief Complaint: pt complains of pelvic pain/adductor and groin pain that has been ongoing since 14 weeks into her 2nd pregnancy and post partum.  She is 5 1/2 months post partum, .  Pt reports that pain is limiting her ability to walk, exercise and run.  Pt points to pain in R adductor region > left.  Does admit to pain with intercourse.  Has mild urge incontinence that is infrequent.  During her pregnancies, she reports babies sitting very low on her pelvis and she did experience pubic symphysis pain with her 2nd pregnancy.  Pt no longer breastfeeding.Surgeries include:  Orthopedic surgery and other (Left ACL, sinus).  Current medications:  Other (NSAIDS).  Primary job tasks include:  Computer work and prolonged sitting. Occupation: .     Date of Onset: MD order dated 2019  Pain rating is: 4/10  Quality of pain is sharp/dull ache and frequency is constant/intermittent depending on activity  Pain dependence on time of day is: worse in the evening  Progression of symptoms: same  Imaging/Special Tests include: none  Previous treatments include: NA  Patient reports general health is: excellent  Pertinent Medical History  includes: NA  Surgical History includes: L ACL surgery  Current Occupation is:   Barriers include: NA  Red flags include: NA  Patient's expectations for therapy include: improve strength to be able to resume active lifestyle.  Patient reports pain:  SI joint left and groin. Radiates to:  Pubis and groin right. Associated with: weakness. Symptoms are exacerbated by walking, intercourse and running (standing) and relieved by rest.  Re: Marla Pavon   :   1985                Objective:  Gait:  Decreased R hip extension with increased pelvic rotation noted with swing phase  Flexibility/Screens:   Positive screens:  SI Joint     Lumbar/SI Evaluation  ROM:  AROM Lumbar: normal  Lumbar Myotomes:  normal    Pelvic Dysfunction Evaluation:    Flexibility:    Tightness present at:Adductors; Iliopsoas; Piriformis and Gluteals  Abdominal Wall:  Abdominal wall pelvic: noted poor TA activation with tendency to use upper abdominals/diaphragm.  Diastasis Recti:  Present (1 finger separation)  Pelvic Clock Exam:  Pelvic clock exam: R > L, palpation reproduced her pelvic pain.  Ischiocavernosis pain:  +  Bulbocavernosis pain:  +  Levator ANI:  ++  External Assessment:    Skin Condition:  Normal  Scars:  Well healed  Bearing Down/Coughing:  Normal  Tissue Symmetry:  Normal  Introitus:  Normal  Muscle Contraction/Perineal Mobility:  Slight lift, no urogential triangle descent and substitution  Internal Assessment:    Contraction/Grade:  Weak squeeze, 2 second hold (2)    Hip Evaluation  Hip Strength:  : B hip abductor strength 4/5 with pain on L.  gluts bilaterally 4-/5 with pain resisted test on L.  Functional Testing:    Quad:    Single leg squat:   Left:    Mild loss of control and excessive anterior knee excursion  Right:   Decreased hip/trunk flexion, excessive anterior knee excursion, femoral IR and moderate loss of control  General   ROS    Assessment/Plan:    Patient is a 34 year old female with pelvic  complaints.    Patient has the following significant findings with corresponding treatment plan.                Diagnosis 1:  Pelvic floor dysfunction   Pain -  manual therapy, self management, education and home program  Decreased ROM/flexibility - manual therapy, therapeutic exercise, therapeutic activity and home program  Decreased joint mobility - manual therapy, therapeutic exercise, therapeutic activity and home program  Decreased strength - therapeutic exercise, therapeutic activities and home program  Impaired gait - gait training and home program  Impaired muscle performance - biofeedback, neuro re-education and home program  Decreased function - therapeutic activities and home program  Impaired posture - neuro re-education, therapeutic activities and home program    Therapy Evaluation Codes:   1) History comprised of:   Personal factors that impact the plan of care:      None.    Comorbidity factors that impact the plan of care are:      None.     Medications impacting care: None.  2) Examination of Body Systems comprised of:   Body structures and functions that impact the plan of care:      Lumbar spine, Pelvis and Sacral illiac joint.   Activity limitations that impact the plan of care are:      Standing, Walking and Using a tampon.  3) Clinical presentation characteristics are:   Stable/Uncomplicated.  4) Decision-Making    Low complexity using standardized patient assessment instrument and/or measureable assessment of functional outcome.  Cumulative Therapy Evaluation is: Low complexity.    Communication ability:  Patient appears to be able to clearly communicate and understand verbal and written communication and follow directions correctly.  Treatment Explanation - The following has been discussed with the patient:   RX ordered/plan of care  Anticipated outcomes  Possible risks and side effects  This patient would benefit from PT intervention to resume normal activities.   Rehab potential is  good.  Frequency:  1 X week, once daily  Duration:  for 8 weeks  Discharge Plan:  Achieve all LTG.  Independent in home treatment program.  Reach maximal therapeutic benefit.     Thank you for your referral.    INQUIRIES  Therapist: Oskar Weston DPT   [place] [facility] PHYSICAL THERAPY  [address] [place], [place] [id number]  [facility] [address]  Phone: [phone]  Fax: [phone]

## 2019-10-15 ENCOUNTER — THERAPY VISIT (OUTPATIENT)
Dept: PHYSICAL THERAPY | Facility: CLINIC | Age: 34
End: 2019-10-15
Payer: COMMERCIAL

## 2019-10-15 DIAGNOSIS — M25.559 PAIN IN JOINT, PELVIC REGION AND THIGH, UNSPECIFIED LATERALITY: ICD-10-CM

## 2019-10-15 DIAGNOSIS — M99.05 PELVIC SOMATIC DYSFUNCTION: ICD-10-CM

## 2019-10-15 PROCEDURE — 97112 NEUROMUSCULAR REEDUCATION: CPT | Mod: GP | Performed by: PHYSICAL THERAPIST

## 2019-10-15 PROCEDURE — 97140 MANUAL THERAPY 1/> REGIONS: CPT | Mod: GP | Performed by: PHYSICAL THERAPIST

## 2019-10-15 PROCEDURE — 97110 THERAPEUTIC EXERCISES: CPT | Mod: GP | Performed by: PHYSICAL THERAPIST

## 2019-10-22 ENCOUNTER — THERAPY VISIT (OUTPATIENT)
Dept: PHYSICAL THERAPY | Facility: CLINIC | Age: 34
End: 2019-10-22
Payer: COMMERCIAL

## 2019-10-22 DIAGNOSIS — M25.559 PAIN IN JOINT, PELVIC REGION AND THIGH, UNSPECIFIED LATERALITY: ICD-10-CM

## 2019-10-22 DIAGNOSIS — M99.05 PELVIC SOMATIC DYSFUNCTION: ICD-10-CM

## 2019-10-22 PROCEDURE — 97140 MANUAL THERAPY 1/> REGIONS: CPT | Mod: GP | Performed by: PHYSICAL THERAPIST

## 2019-10-22 PROCEDURE — 97112 NEUROMUSCULAR REEDUCATION: CPT | Mod: GP | Performed by: PHYSICAL THERAPIST

## 2019-10-22 PROCEDURE — 97110 THERAPEUTIC EXERCISES: CPT | Mod: GP | Performed by: PHYSICAL THERAPIST

## 2019-10-29 ENCOUNTER — THERAPY VISIT (OUTPATIENT)
Dept: PHYSICAL THERAPY | Facility: CLINIC | Age: 34
End: 2019-10-29
Payer: COMMERCIAL

## 2019-10-29 DIAGNOSIS — M25.559 PAIN IN JOINT, PELVIC REGION AND THIGH, UNSPECIFIED LATERALITY: ICD-10-CM

## 2019-10-29 DIAGNOSIS — M99.05 PELVIC SOMATIC DYSFUNCTION: ICD-10-CM

## 2019-10-29 PROCEDURE — 97530 THERAPEUTIC ACTIVITIES: CPT | Mod: GP | Performed by: PHYSICAL THERAPIST

## 2019-10-29 PROCEDURE — 97110 THERAPEUTIC EXERCISES: CPT | Mod: GP | Performed by: PHYSICAL THERAPIST

## 2019-12-10 PROBLEM — M25.559 PAIN IN JOINT, PELVIC REGION AND THIGH, UNSPECIFIED LATERALITY: Status: RESOLVED | Noted: 2019-10-08 | Resolved: 2019-12-10

## 2019-12-10 PROBLEM — M99.05 PELVIC SOMATIC DYSFUNCTION: Status: RESOLVED | Noted: 2019-10-08 | Resolved: 2019-12-10

## 2019-12-10 NOTE — PROGRESS NOTES
DISCHARGE REPORT    Progress reporting period is from 10/8/2019 to 10/29/2019.       SUBJECTIVE  Subjective changes noted by patient:  .  Subjective: pain is less frequent and intense.  after last appt, felt some L sided SI painfor about 2 days.      Current pain level is  Current Pain level: 2/10.     Previous pain level was   Initial Pain level: 4/10.   Changes in function:  Yes (See Goal flowsheet attached for changes in current functional level)  Adverse reaction to treatment or activity: None    OBJECTIVE  Changes noted in objective findings:  Patient has failed to return to therapy so current objective findings are unknown. and The objective findings below are from DOS 10/29/2019.  Objective: squat noted anterior load and increased lumbar lordosis.  Max cues to keep post wt shift.     ASSESSMENT/PLAN  Updated problem list and treatment plan: Diagnosis 1:  Pelvic floor dysfunction       STG/LTGs have been met or progress has been made towards goals:  Yes (See Goal flow sheet completed today.)  Assessment of Progress: The patient has not returned to therapy. Current status is unknown.  Patient is meeting short term goals and is progressing towards long term goals.  Self Management Plans:  Patient is independent in a home treatment program.  Patient is independent in self management of symptoms.  I have re-evaluated this patient and find that the nature, scope, duration and intensity of the therapy is appropriate for the medical condition of the patient.  Marla continues to require the following intervention to meet STG and LTG's:  PT intervention is no longer required to meet STG/LTG.    Recommendations:  This patient is ready to be discharged from therapy and continue their home treatment program.    Please refer to the daily flowsheet for treatment today, total treatment time and time spent performing 1:1 timed codes.

## 2020-05-27 ENCOUNTER — NURSE TRIAGE (OUTPATIENT)
Dept: NURSING | Facility: CLINIC | Age: 35
End: 2020-05-27

## 2020-05-27 DIAGNOSIS — Z11.59 SCREENING FOR VIRAL DISEASE: Primary | ICD-10-CM

## 2020-05-27 NOTE — TELEPHONE ENCOUNTER
"RN triage call    Patient is calling requesting COVID serologic antibody testing.  NOTE: Serologic testing is a blood test for 'antibodies' which are made at 10-14 days after you have had symptoms of COVID or were exposed and had an asymptomatic infection.  This does NOT test you for 'active' infection or tell you if you are contagious.    Are you a healthcare worker?  No  Do you currently have a cough, fever, body aches, shortness of breath, or difficulty breathing?  No  Did you previously have cough, fever, body aches, shortness of breath, or difficulty breathing that have now resolved? Has had previous covid symptoms.   Symptoms began 90+ days ago.  Symptoms started > 14 days ago. Lab order placed per SARS-CoV-2 Serology test Standing Order using indication \"Previously symptomatic >14d since onset, currently asymptomatic\" and diagnosis code \"Screening for viral disease\" (Z11.59)    Teressa Treadwell RN  [facility] Nurse Advisor    The patient was informed: \"Testing is limited each day and it may take time for testing to be available to everyone who has called. You will receive a call within 48-72 hours to schedule the serology testing. Please confirm the best number to reach you is [phone]. If you have any questions about scheduling, call [phone].\"     "